# Patient Record
Sex: FEMALE | Race: WHITE | NOT HISPANIC OR LATINO | Employment: UNEMPLOYED | URBAN - METROPOLITAN AREA
[De-identification: names, ages, dates, MRNs, and addresses within clinical notes are randomized per-mention and may not be internally consistent; named-entity substitution may affect disease eponyms.]

---

## 2019-01-06 ENCOUNTER — APPOINTMENT (OUTPATIENT)
Dept: CT IMAGING | Facility: HOSPITAL | Age: 44
End: 2019-01-06

## 2019-01-06 ENCOUNTER — HOSPITAL ENCOUNTER (EMERGENCY)
Facility: HOSPITAL | Age: 44
Discharge: HOME | End: 2019-01-07
Attending: EMERGENCY MEDICINE | Admitting: EMERGENCY MEDICINE

## 2019-01-06 DIAGNOSIS — S06.0X0A CONCUSSION WITHOUT LOSS OF CONSCIOUSNESS, INITIAL ENCOUNTER: Primary | ICD-10-CM

## 2019-01-06 PROCEDURE — 99284 EMERGENCY DEPT VISIT MOD MDM: CPT

## 2019-01-06 PROCEDURE — 70450 CT HEAD/BRAIN W/O DYE: CPT

## 2019-01-06 RX ORDER — ACETAMINOPHEN 500 MG
1000 TABLET ORAL ONCE
Status: DISCONTINUED | OUTPATIENT
Start: 2019-01-06 | End: 2019-01-06

## 2019-01-06 RX ORDER — ONDANSETRON 4 MG/1
4 TABLET, ORALLY DISINTEGRATING ORAL ONCE
Status: COMPLETED | OUTPATIENT
Start: 2019-01-06 | End: 2019-01-06

## 2019-01-06 RX ORDER — ONDANSETRON 4 MG/1
4 TABLET, ORALLY DISINTEGRATING ORAL ONCE
Status: DISCONTINUED | OUTPATIENT
Start: 2019-01-06 | End: 2019-01-06

## 2019-01-06 RX ORDER — TRAZODONE HYDROCHLORIDE 50 MG/1
50 TABLET ORAL NIGHTLY
COMMUNITY

## 2019-01-06 RX ORDER — ACETAMINOPHEN 500 MG
1000 TABLET ORAL ONCE
Status: COMPLETED | OUTPATIENT
Start: 2019-01-06 | End: 2019-01-06

## 2019-01-06 RX ORDER — BUPROPION HYDROCHLORIDE 150 MG/1
300 TABLET, EXTENDED RELEASE ORAL 2 TIMES DAILY
COMMUNITY
End: 2022-02-10

## 2019-01-06 RX ORDER — LEVOTHYROXINE SODIUM 0.05 MG/1
50 TABLET ORAL DAILY
COMMUNITY
End: 2022-02-10

## 2019-01-06 RX ADMIN — ONDANSETRON 4 MG: 4 TABLET, ORALLY DISINTEGRATING ORAL at 22:56

## 2019-01-06 RX ADMIN — ACETAMINOPHEN 1000 MG: 500 TABLET ORAL at 22:54

## 2019-01-07 VITALS
TEMPERATURE: 97.7 F | BODY MASS INDEX: 30.31 KG/M2 | HEIGHT: 68 IN | OXYGEN SATURATION: 95 % | RESPIRATION RATE: 16 BRPM | WEIGHT: 200 LBS | HEART RATE: 88 BPM | SYSTOLIC BLOOD PRESSURE: 132 MMHG | DIASTOLIC BLOOD PRESSURE: 88 MMHG

## 2019-01-07 RX ORDER — ONDANSETRON 4 MG/1
4 TABLET, ORALLY DISINTEGRATING ORAL EVERY 6 HOURS PRN
Qty: 20 TABLET | Refills: 0 | Status: SHIPPED | OUTPATIENT
Start: 2019-01-07 | End: 2019-01-12

## 2019-01-07 NOTE — ED PROVIDER NOTES
Subjective   Ms. Melissa Smith is a 43 y.o. female who presents to the ED for evaluation s/p fall. She reports 2 hours ago she was walking out of the bathroom when she had a mechanical trip and fall and landed on her left side and did hit her head. She is not sure if she lost consciousness. She reports she was able to ambulate and carry out tasks but she began experiencing left sided headache which radiates to the back of her neck. She describes the radiating pain as sharp. She also complains of numbness in her cheeks. She denies history of head surgery. She has Factor V Leiden mutation and does not take anticoagulants. There are no other acute symptoms at this time.        History provided by:  Patient  Fall   Mechanism of injury: fall    Injury location:  Head/neck, shoulder/arm and pelvis  Head/neck injury location:  Head  Shoulder/arm injury location:  L shoulder  Pelvic injury location:  L hip  Incident location:  Home  Time since incident:  2 hours  Arrived directly from scene: no    Fall:     Fall occurred:  Walking and tripped    Impact surface:  Hard floor    Point of impact: Left side including head.  Protective equipment: none    Tetanus status:  Unknown  Prior to arrival data:     Patient ambulatory at scene: yes      Blood loss:  None    Amnesic to event: no    Associated symptoms: headaches    Risk factors: no anticoagulation therapy        Review of Systems   Neurological: Positive for headaches.   All other systems reviewed and are negative.      Past Medical History:   Diagnosis Date   • Charcot-Gladys-Tooth disease    • Disease of thyroid gland        Allergies   Allergen Reactions   • Sulfa Antibiotics Anaphylaxis   • Amoxicillin Rash       Past Surgical History:   Procedure Laterality Date   •  SECTION     • CHOLECYSTECTOMY     • FOOT SURGERY         History reviewed. No pertinent family history.    Social History     Socioeconomic History   • Marital status:      Spouse name:  Not on file   • Number of children: Not on file   • Years of education: Not on file   • Highest education level: Not on file   Tobacco Use   • Smoking status: Never Smoker   Substance and Sexual Activity   • Alcohol use: Yes     Comment: rare   • Drug use: No         Objective   Physical Exam   Constitutional: She is oriented to person, place, and time. She appears well-developed and well-nourished. No distress.   HENT:   Head: Normocephalic and atraumatic.   Nose: Nose normal.   Eyes: Conjunctivae are normal. No scleral icterus.   Neck: Normal range of motion. Neck supple.   Cardiovascular: Normal rate, regular rhythm and normal heart sounds.   No murmur heard.  Pulmonary/Chest: Effort normal and breath sounds normal. No respiratory distress.   Abdominal: Soft. Bowel sounds are normal. There is no tenderness.   Musculoskeletal: Normal range of motion. She exhibits no edema.   Neurological: She is alert and oriented to person, place, and time.   Skin: Skin is warm and dry.   Psychiatric: She has a normal mood and affect. Her behavior is normal.   Nursing note and vitals reviewed.      Procedures         ED Course     No results found for this or any previous visit (from the past 24 hour(s)).  Note: In addition to lab results from this visit, the labs listed above may include labs taken at another facility or during a different encounter within the last 24 hours. Please correlate lab times with ED admission and discharge times for further clarification of the services performed during this visit.    CT Head Without Contrast   Final Result   No acute intracranial hemorrhage or mass effect.       THIS DOCUMENT HAS BEEN ELECTRONICALLY SIGNED BY MICHAEL CASTELLANOS MD        Vitals:    01/06/19 2320 01/06/19 2330 01/07/19 0000 01/07/19 0033   BP:  132/80 132/88 132/88   BP Location:    Right arm   Patient Position:    Lying   Pulse:    88   Resp:    16   Temp:       TempSrc:       SpO2: 95% 95% 94% 95%   Weight:       Height:          Medications   acetaminophen (TYLENOL) tablet 1,000 mg (1,000 mg Oral Given 1/6/19 9393)   ondansetron ODT (ZOFRAN-ODT) disintegrating tablet 4 mg (4 mg Oral Given 1/6/19 2256)     ECG/EMG Results (last 24 hours)     ** No results found for the last 24 hours. **                        MDM  Number of Diagnoses or Management Options  Concussion without loss of consciousness, initial encounter: new and requires workup  Diagnosis management comments: CT scan head does not show any acute after maladies.    Patient has maintained normal and stable vital signs throughout ER course.    Patient will be discharged with the diagnosis of concussion.    She has been given closed head injury precautions.    She will be given Zofran to help with nausea, and is advised to take Tylenol or ibuprofen to help with pain.       Amount and/or Complexity of Data Reviewed  Tests in the radiology section of CPT®: ordered and reviewed  Obtain history from someone other than the patient: yes  Review and summarize past medical records: yes  Independent visualization of images, tracings, or specimens: yes        Final diagnoses:   Concussion without loss of consciousness, initial encounter       Documentation assistance provided by torres Blackburn.  Information recorded by the torres was done at my direction and has been verified and validated by me.     Gregory Blackburn  01/06/19 2233       Gregory Blackburn  01/07/19 0016       Gregory Blackburn  01/07/19 0026       Christi Greco MD  01/07/19 5000

## 2019-01-07 NOTE — DISCHARGE INSTRUCTIONS
Patient is advised to take ibuprofen or tylenol for any headache or pain.     Take zofran as needed for nausea.     Follow-up with PCP for recheck in 1 week.

## 2022-02-10 ENCOUNTER — OFFICE VISIT (OUTPATIENT)
Dept: NEUROLOGY | Facility: CLINIC | Age: 47
End: 2022-02-10

## 2022-02-10 ENCOUNTER — LAB (OUTPATIENT)
Dept: LAB | Facility: HOSPITAL | Age: 47
End: 2022-02-10

## 2022-02-10 VITALS
OXYGEN SATURATION: 98 % | HEART RATE: 86 BPM | BODY MASS INDEX: 34.1 KG/M2 | DIASTOLIC BLOOD PRESSURE: 80 MMHG | WEIGHT: 225 LBS | HEIGHT: 68 IN | SYSTOLIC BLOOD PRESSURE: 122 MMHG

## 2022-02-10 DIAGNOSIS — R20.2 NUMBNESS AND TINGLING: Primary | ICD-10-CM

## 2022-02-10 DIAGNOSIS — M79.10 MYALGIA: ICD-10-CM

## 2022-02-10 DIAGNOSIS — R20.2 NUMBNESS AND TINGLING: ICD-10-CM

## 2022-02-10 DIAGNOSIS — R20.0 NUMBNESS AND TINGLING: ICD-10-CM

## 2022-02-10 DIAGNOSIS — R20.0 NUMBNESS AND TINGLING: Primary | ICD-10-CM

## 2022-02-10 PROCEDURE — 99204 OFFICE O/P NEW MOD 45 MIN: CPT | Performed by: PSYCHIATRY & NEUROLOGY

## 2022-02-10 RX ORDER — NAPROXEN 500 MG/1
500 TABLET ORAL 2 TIMES DAILY
COMMUNITY
Start: 2021-12-12

## 2022-02-10 RX ORDER — CYCLOBENZAPRINE HCL 10 MG
TABLET ORAL EVERY 12 HOURS SCHEDULED
COMMUNITY

## 2022-02-10 RX ORDER — DIPHENOXYLATE HYDROCHLORIDE AND ATROPINE SULFATE 2.5; .025 MG/1; MG/1
TABLET ORAL
COMMUNITY

## 2022-02-10 RX ORDER — METHOCARBAMOL 750 MG/1
TABLET, FILM COATED ORAL
COMMUNITY
Start: 2021-11-11

## 2022-02-10 RX ORDER — MULTIVITAMIN WITH IRON
TABLET ORAL
COMMUNITY

## 2022-02-10 RX ORDER — LEVOTHYROXINE SODIUM 0.07 MG/1
75 TABLET ORAL DAILY
COMMUNITY
Start: 2021-11-23

## 2022-02-10 RX ORDER — FEXOFENADINE HCL 180 MG/1
TABLET ORAL
COMMUNITY

## 2022-02-10 NOTE — PROGRESS NOTES
Subjective:    CC: Melissa Smith is seen today in consultation at the request of MARYAN Del Rio for Muscle weakness       HPI:  56-year-old female with a past medical history of hypothyroidism, fibromyalgia presents with muscle weakness and paresthesias.  As per patient she started having numbness and tingling in various parts of her body such as her upper back, arms and feet about 3 years ago.  These episodes can sometimes last for 20 minutes.  She also has muscle pain as well as a feeling of weakness in her arms and legs.  Previous CK was normal.  Her chiropractor did strength testing and she was told that her strength has deteriorated since August in both upper and lower extremities.  In the past she saw a rheumatologist who diagnosed her with fibromyalgia and started her on gabapentin that caused vision problems as well as Cymbalta but also caused her to have side effects.  Lyrica was prescribed but never started due to insurance denying it.  She has a family history of neuropathy in several family members.  Her daughter is also currently being worked up for Lexx-Danlos syndrome which the patient feels she could have as well as she has hypermobility of joints.  In 2019 patient had a MRI brain (after she sustained a concussion causing blurry vision) that was completely normal.  Of note-I reviewed her PCPs notes from [facility]    The following portions of the patient's history were reviewed today and updated as of 02/10/2022  : allergies, current medications, past family history, past medical history, past social history, past surgical history and problem list  These document will be scanned to patient's chart.      Current Outpatient Medications:   •  Calcium Carb-Cholecalciferol 600-800 MG-UNIT chewable tablet, Every 12 (Twelve) Hours., Disp: , Rfl:   •  Cyanocobalamin 5000 MCG sublingual tablet, cyanocobalamin (vit B-12) 5,000 mcg sublingual tablet  Place 1 tablet every day by  "sublingual route., Disp: , Rfl:   •  cyclobenzaprine (FLEXERIL) 10 MG tablet, Every 12 (Twelve) Hours., Disp: , Rfl:   •  fexofenadine (Allegra Allergy) 180 MG tablet, Allegra Allergy  daily, Disp: , Rfl:   •  levothyroxine (SYNTHROID, LEVOTHROID) 75 MCG tablet, Take 75 mcg by mouth Daily., Disp: , Rfl:   •  Magnesium 250 MG tablet, , Disp: , Rfl:   •  methocarbamol (ROBAXIN) 750 MG tablet, , Disp: , Rfl:   •  multivitamin (THERAGRAN) tablet tablet, Daily Multi-Vitamin tablet  Take by oral route., Disp: , Rfl:   •  naproxen (NAPROSYN) 500 MG tablet, Take 500 mg by mouth 2 (Two) Times a Day., Disp: , Rfl:   •  traZODone (DESYREL) 50 MG tablet, Take 50 mg by mouth Every Night., Disp: , Rfl:    Past Medical History:   Diagnosis Date   • Charcot-Gladys-Tooth disease    • Disease of thyroid gland       Past Surgical History:   Procedure Laterality Date   •  SECTION     • CHOLECYSTECTOMY     • FOOT SURGERY        History reviewed. No pertinent family history.   Social History     Socioeconomic History   • Marital status:    Tobacco Use   • Smoking status: Never Smoker   • Smokeless tobacco: Never Used   Vaping Use   • Vaping Use: Never used   Substance and Sexual Activity   • Alcohol use: Yes     Comment: rare   • Drug use: No   • Sexual activity: Defer     Review of Systems    Objective:    /80   Pulse 86   Ht 172.7 cm (68\")   Wt 102 kg (225 lb)   SpO2 98%   BMI 34.21 kg/m²     Neurology Exam:    General apperance: NAD.     Mental status: Alert, awake and oriented to time place and person.    Recent and Remote memory: Intact.    Attention span and Concentration: Normal.     Language and Speech: Intact- No dysarthria.    Fluency, Naming , Repitition and Comprehension:  Intact    Cranial Nerves:   CN II: Visual fields are full. Intact. Fundi - Normal, No papillederma, Pupils - ISIDRO  CN III, IV and VI: Extraocular movements are intact. Normal saccades.   CN V: Facial sensation is intact.   CN VII: " Muscles of facial expression reveal no asymmetry. Intact.   CN VIII: Hearing is intact. Whispered voice intact.   CN IX and X: Palate elevates symmetrically. Intact  CN XI: Shoulder shrug is intact.   CN XII: Tongue is midline without evidence of atrophy or fasciculation.     Ophthalmoscopic exam of optic disc-normal    Motor:  Right UE muscle strength 5/5. Normal tone.     Left UE muscle strength 5/5. Normal tone.      Right LE muscle strength 5-/5. Normal tone.     Left LE muscle strength 5-/5. Normal tone.      Sensory: Normal light touch and pinprick sensation bilaterally as well as her back.  Mildly reduced vibration in feet    DTRs: 3+ bilaterally in upper and lower extremities.  No ankle clonus noted    Babinski: Negative bilaterally.    Co-ordination: Normal finger-to-nose, heel to shin B/L.    Rhomberg: Negative.    Gait: Normal.  Had difficulty with tandem walking    Cardiovascular: Regular rate and rhythm without murmur, gallop or rub.    Assessment and Plan:  1. Numbness and tingling  She could have mild neuropathy in addition to cervical radiculopathy  Since she complains of paresthesias in her upper back radiating into her arms I will get her MRI cervical spine to rule out any demyelinating lesions  Will obtain EMG/NCS and neuropathy panel    - BREANNA by IFA, Reflex 9-biomarkers profile; Future  - Hemoglobin A1c; Future  - Protein Elec + Interp, Serum; Future  - Lyme Disease IgG/IgM Antibodies; Future  - TSH+Free T4; Future  - Vitamin B12 & Folate; Future  - Vitamin B6; Future  - MRI Cervical Spine Without Contrast; Future  - EMG & Nerve Conduction Test; Future    2. Myalgia  I will recheck CK in addition to ESR, CRP and obtain an EMG/NCS  I feel her myalgias/subjective weakness could be due to fibromyalgia  If all her testing is normal then I may try to get Lyrica approved.  In the past she has tried gabapentin and Cymbalta both of which cause side effects  She also wants a referral to a specialist to  rule out Lexx-Danlos however we will wait till testing completed    - Sedimentation Rate; Future  - C-reactive Protein; Future  - CK; Future  - EMG & Nerve Conduction Test; Future       Return in about 6 weeks (around 3/24/2022).     I spent over 45 minutes with the patient face to face out of which over 50% (30 minutes) was spent in management, instructions and education.     Flor Mccollum MD

## 2022-03-02 ENCOUNTER — HOSPITAL ENCOUNTER (OUTPATIENT)
Dept: MRI IMAGING | Facility: HOSPITAL | Age: 47
Discharge: HOME | End: 2022-03-02
Admitting: PSYCHIATRY & NEUROLOGY

## 2022-03-02 DIAGNOSIS — R20.0 NUMBNESS AND TINGLING: ICD-10-CM

## 2022-03-02 DIAGNOSIS — R20.2 NUMBNESS AND TINGLING: ICD-10-CM

## 2022-03-02 LAB
ALBUMIN SERPL ELPH-MCNC: 4 G/DL (ref 2.9–4.4)
ALBUMIN/GLOB SERPL: 1.3 {RATIO} (ref 0.7–1.7)
ALPHA1 GLOB SERPL ELPH-MCNC: 0.3 G/DL (ref 0–0.4)
ALPHA2 GLOB SERPL ELPH-MCNC: 0.7 G/DL (ref 0.4–1)
ANA TITR SER IF: NEGATIVE {TITER}
B-GLOBULIN SERPL ELPH-MCNC: 1.2 G/DL (ref 0.7–1.3)
CK SERPL-CCNC: 50 U/L (ref 20–180)
CRP SERPL-MCNC: 0.34 MG/DL (ref 0–0.5)
ERYTHROCYTE [SEDIMENTATION RATE] IN BLOOD BY WESTERGREN METHOD: 16 MM/HR (ref 0–20)
FOLATE SERPL-MCNC: 14.8 NG/ML (ref 4.78–24.2)
GAMMA GLOB SERPL ELPH-MCNC: 1.1 G/DL (ref 0.4–1.8)
GLOBULIN SER-MCNC: 3.3 G/DL (ref 2.2–3.9)
HBA1C MFR BLD: 5.3 % (ref 4.8–5.6)
IGA SERPL-MCNC: 171 MG/DL (ref 87–352)
IGG SERPL-MCNC: 985 MG/DL (ref 586–1602)
IGM SERPL-MCNC: 152 MG/DL (ref 26–217)
INTERPRETATION SERPL IEP-IMP: NORMAL
LABORATORY COMMENT REPORT: NORMAL
LABORATORY COMMENT REPORT: NORMAL
M PROTEIN SERPL ELPH-MCNC: NORMAL G/DL
PROT SERPL-MCNC: 7.3 G/DL (ref 6–8.5)
T4 FREE SERPL-MCNC: 1.47 NG/DL (ref 0.93–1.7)
TSH SERPL DL<=0.005 MIU/L-ACNC: 1.49 UIU/ML (ref 0.27–4.2)
VIT B12 SERPL-MCNC: 963 PG/ML (ref 211–946)
VIT B6 SERPL-MCNC: 6.1 UG/L (ref 2–32.8)

## 2022-03-02 PROCEDURE — 72141 MRI NECK SPINE W/O DYE: CPT

## 2022-03-16 ENCOUNTER — HOSPITAL ENCOUNTER (OUTPATIENT)
Dept: NEUROLOGY | Facility: HOSPITAL | Age: 47
Discharge: HOME | End: 2022-03-16
Admitting: PSYCHIATRY & NEUROLOGY

## 2022-03-16 DIAGNOSIS — R20.0 NUMBNESS AND TINGLING: ICD-10-CM

## 2022-03-16 DIAGNOSIS — R20.2 NUMBNESS AND TINGLING: ICD-10-CM

## 2022-03-16 DIAGNOSIS — M79.10 MYALGIA: ICD-10-CM

## 2022-03-16 PROCEDURE — 95886 MUSC TEST DONE W/N TEST COMP: CPT

## 2022-03-16 PROCEDURE — 95911 NRV CNDJ TEST 9-10 STUDIES: CPT

## 2022-04-06 ENCOUNTER — OFFICE VISIT (OUTPATIENT)
Dept: NEUROLOGY | Facility: CLINIC | Age: 47
End: 2022-04-06

## 2022-04-06 VITALS
DIASTOLIC BLOOD PRESSURE: 80 MMHG | HEART RATE: 93 BPM | HEIGHT: 68 IN | OXYGEN SATURATION: 95 % | SYSTOLIC BLOOD PRESSURE: 128 MMHG | TEMPERATURE: 97.5 F | BODY MASS INDEX: 34.22 KG/M2 | WEIGHT: 225.8 LBS

## 2022-04-06 DIAGNOSIS — M79.7 FIBROMYALGIA: ICD-10-CM

## 2022-04-06 DIAGNOSIS — R20.0 NUMBNESS AND TINGLING: Primary | ICD-10-CM

## 2022-04-06 DIAGNOSIS — R20.2 NUMBNESS AND TINGLING: Primary | ICD-10-CM

## 2022-04-06 DIAGNOSIS — G44.85 IDIOPATHIC STABBING HEADACHE: ICD-10-CM

## 2022-04-06 PROCEDURE — 99214 OFFICE O/P EST MOD 30 MIN: CPT | Performed by: PSYCHIATRY & NEUROLOGY

## 2022-04-06 RX ORDER — INDOMETHACIN 50 MG/1
CAPSULE ORAL
Qty: 30 CAPSULE | Refills: 1 | Status: SHIPPED | OUTPATIENT
Start: 2022-04-06

## 2022-04-06 NOTE — PROGRESS NOTES
Subjective:    CC: Melissa Smith is seen today for paresthesias and muscle weakness    Current visit-since her last visit patient had her MRI of the cervical spine that did not show any lesions or significant disc bulges with no spinal canal or neural foraminal narrowing.  She also had her EMG/NCS of her right arm and left leg that did not show any evidence of peripheral neuropathy, myopathy or compression neuropathy.  Her neuropathy panel was completely normal other than a slightly low vitamin B6 level of 6.1.  She states she continues to have tingling in different parts of her body such as her face, upper back, hands and legs (more so on the right) as well as a feeling of weakness and pain in her muscles/joints.  She takes either Flexeril or methocarbamol at night as needed.  She has also been having stabbing headaches lasting for 5 to 10 minutes.  Denies snoring at night or any daytime somnolence.  Of note-I personally reviewed her MRI cervical spine    Initial oipsr-44-dagh-old female with a past medical history of hypothyroidism, fibromyalgia presents with muscle weakness and paresthesias.  As per patient she started having numbness and tingling in various parts of her body such as her upper back, arms and feet about 3 years ago.  These episodes can sometimes last for 20 minutes.  She also has muscle pain as well as a feeling of weakness in her arms and legs.  Previous CK was normal.  Her chiropractor did strength testing and she was told that her strength has deteriorated since August in both upper and lower extremities.  In the past she saw a rheumatologist who diagnosed her with fibromyalgia and started her on gabapentin that caused vision problems as well as Cymbalta but also caused her to have side effects.  Lyrica was prescribed but never started due to insurance denying it.  She has a family history of neuropathy in several family members.  Her daughter is also currently being worked up for  Lexx-Danlos syndrome which the patient feels she could have as well as she has hypermobility of joints.  In 2019 patient had a MRI brain (after she sustained a concussion causing blurry vision) that was completely normal.  Of note-I reviewed her PCPs notes from [facility]    The following portions of the patient's history were reviewed today and updated as of 02/10/2022  : allergies, current medications, past family history, past medical history, past social history, past surgical history and problem list  These document will be scanned to patient's chart.      Current Outpatient Medications:   •  Calcium Carb-Cholecalciferol 600-800 MG-UNIT chewable tablet, Every 12 (Twelve) Hours., Disp: , Rfl:   •  Cyanocobalamin 5000 MCG sublingual tablet, cyanocobalamin (vit B-12) 5,000 mcg sublingual tablet  Place 1 tablet every day by sublingual route., Disp: , Rfl:   •  cyclobenzaprine (FLEXERIL) 10 MG tablet, Every 12 (Twelve) Hours., Disp: , Rfl:   •  fexofenadine (ALLEGRA) 180 MG tablet, Allegra Allergy  daily, Disp: , Rfl:   •  levothyroxine (SYNTHROID, LEVOTHROID) 75 MCG tablet, Take 75 mcg by mouth Daily., Disp: , Rfl:   •  Magnesium 250 MG tablet, , Disp: , Rfl:   •  methocarbamol (ROBAXIN) 750 MG tablet, , Disp: , Rfl:   •  multivitamin (THERAGRAN) tablet tablet, Daily Multi-Vitamin tablet  Take by oral route., Disp: , Rfl:   •  naproxen (NAPROSYN) 500 MG tablet, Take 500 mg by mouth 2 (Two) Times a Day., Disp: , Rfl:   •  traZODone (DESYREL) 50 MG tablet, Take 50 mg by mouth Every Night., Disp: , Rfl:   •  indomethacin (INDOCIN) 50 MG capsule, Take 1 tablet as needed for stabbing headaches.  May take up to 3 pills a day, Disp: 30 capsule, Rfl: 1   Past Medical History:   Diagnosis Date   • Charcot-Gladys-Tooth disease    • Disease of thyroid gland       Past Surgical History:   Procedure Laterality Date   •  SECTION     • CHOLECYSTECTOMY     • FOOT SURGERY        History reviewed. No pertinent family  "history.   Social History     Socioeconomic History   • Marital status:    Tobacco Use   • Smoking status: Never Smoker   • Smokeless tobacco: Never Used   Vaping Use   • Vaping Use: Never used   Substance and Sexual Activity   • Alcohol use: Yes     Comment: rare   • Drug use: No   • Sexual activity: Defer     Review of Systems   Musculoskeletal: Positive for myalgias.   Neurological: Positive for numbness and headache.   All other systems reviewed and are negative.      Objective:    /80   Pulse 93   Temp 97.5 °F (36.4 °C)   Ht 172.7 cm (68\")   Wt 102 kg (225 lb 12.8 oz)   SpO2 95%   BMI 34.33 kg/m²     Neurology Exam:    General apperance: NAD.     Mental status: Alert, awake and oriented to time place and person.    Recent and Remote memory: Intact.    Attention span and Concentration: Normal.     Language and Speech: Intact- No dysarthria.    Fluency, Naming , Repitition and Comprehension:  Intact    Cranial Nerves:   CN II: Visual fields are full. Intact. Fundi - Normal, No papillederma, Pupils - ISIDRO  CN III, IV and VI: Extraocular movements are intact. Normal saccades.   CN V: Facial sensation is intact.   CN VII: Muscles of facial expression reveal no asymmetry. Intact.   CN VIII: Hearing is intact. Whispered voice intact.   CN IX and X: Palate elevates symmetrically. Intact  CN XI: Shoulder shrug is intact.   CN XII: Tongue is midline without evidence of atrophy or fasciculation.     Ophthalmoscopic exam of optic disc-normal    Motor:  Right UE muscle strength 5/5. Normal tone.     Left UE muscle strength 5/5. Normal tone.      Right LE muscle strength 5-/5. Normal tone.     Left LE muscle strength 5-/5. Normal tone.      Sensory: Normal light touch and pinprick sensation bilaterally as well as her back.  Mildly reduced vibration in feet    DTRs: 3+ bilaterally in upper and lower extremities.  No ankle clonus noted    Babinski: Negative bilaterally.    Co-ordination: Normal " finger-to-nose, heel to pickens B/L.    Rhomberg: Negative.    Gait: Normal.  Had difficulty with tandem walking    Cardiovascular: Regular rate and rhythm without murmur, gallop or rub.    Assessment and Plan:  1. Numbness and tingling  Patient has paresthesias in different parts of her body including her face, back, arms and legs.  EMG/NCS was normal.  MRI of the cervical spine was also unremarkable  She could start low doses of Lyrica in the future  I have told her to take vitamin B6 supplements for a few months as her level was low  For possible Lexx-Danlos syndrome she could speak to her daughter's physician at [facility] to see if she could be referred for genetic testing as well    2.  Fibromyalgia  Her EMG/NCS was unremarkable.  CK was also normal  I discussed starting her on low doses of Lyrica at night however she wants to wait.  She has previously tried gabapentin and Cymbalta which caused her to have side effects.    3.  Stabbing headaches  I will prescribe her indomethacin 50 mg as needed    Return in about 6 months (around 10/6/2022).     I spent over 25 minutes with the patient face to face out of which over 50% (20 minutes) was spent in management, instructions and education.     Flor Mccollum MD

## 2024-08-04 ENCOUNTER — HOSPITAL ENCOUNTER (EMERGENCY)
Facility: HOSPITAL | Age: 49
Discharge: HOME | End: 2024-08-04
Attending: EMERGENCY MEDICINE | Admitting: EMERGENCY MEDICINE
Payer: MEDICAID

## 2024-08-04 VITALS
TEMPERATURE: 98.1 F | BODY MASS INDEX: 35.46 KG/M2 | DIASTOLIC BLOOD PRESSURE: 76 MMHG | OXYGEN SATURATION: 96 % | HEART RATE: 75 BPM | RESPIRATION RATE: 16 BRPM | WEIGHT: 234 LBS | HEIGHT: 68 IN | SYSTOLIC BLOOD PRESSURE: 128 MMHG

## 2024-08-04 DIAGNOSIS — N30.01 ACUTE CYSTITIS WITH HEMATURIA: ICD-10-CM

## 2024-08-04 DIAGNOSIS — R19.7 DIARRHEA, UNSPECIFIED TYPE: Primary | ICD-10-CM

## 2024-08-04 LAB
ALBUMIN SERPL-MCNC: 4.1 G/DL (ref 3.5–5.2)
ALBUMIN/GLOB SERPL: 1.4 G/DL
ALP SERPL-CCNC: 87 U/L (ref 39–117)
ALT SERPL W P-5'-P-CCNC: 18 U/L (ref 1–33)
ANION GAP SERPL CALCULATED.3IONS-SCNC: 13.2 MMOL/L (ref 5–15)
AST SERPL-CCNC: 22 U/L (ref 1–32)
BACTERIA UR QL AUTO: ABNORMAL /HPF
BASOPHILS # BLD AUTO: 0.01 10*3/MM3 (ref 0–0.2)
BASOPHILS NFR BLD AUTO: 0.1 % (ref 0–1.5)
BILIRUB SERPL-MCNC: 0.3 MG/DL (ref 0–1.2)
BILIRUB UR QL STRIP: NEGATIVE
BUN SERPL-MCNC: 11 MG/DL (ref 6–20)
BUN/CREAT SERPL: 16.7 (ref 7–25)
CALCIUM SPEC-SCNC: 9 MG/DL (ref 8.6–10.5)
CHLORIDE SERPL-SCNC: 102 MMOL/L (ref 98–107)
CLARITY UR: CLEAR
CO2 SERPL-SCNC: 20.8 MMOL/L (ref 22–29)
COLOR UR: YELLOW
CREAT SERPL-MCNC: 0.66 MG/DL (ref 0.57–1)
DEPRECATED RDW RBC AUTO: 43 FL (ref 37–54)
EGFRCR SERPLBLD CKD-EPI 2021: 107.7 ML/MIN/1.73
EOSINOPHIL # BLD AUTO: 0.01 10*3/MM3 (ref 0–0.4)
EOSINOPHIL NFR BLD AUTO: 0.1 % (ref 0.3–6.2)
ERYTHROCYTE [DISTWIDTH] IN BLOOD BY AUTOMATED COUNT: 13.1 % (ref 12.3–15.4)
GLOBULIN UR ELPH-MCNC: 2.9 GM/DL
GLUCOSE SERPL-MCNC: 113 MG/DL (ref 65–99)
GLUCOSE UR STRIP-MCNC: NEGATIVE MG/DL
HCT VFR BLD AUTO: 41.4 % (ref 34–46.6)
HGB BLD-MCNC: 13.7 G/DL (ref 12–15.9)
HGB UR QL STRIP.AUTO: ABNORMAL
HOLD SPECIMEN: NORMAL
HYALINE CASTS UR QL AUTO: ABNORMAL /LPF
IMM GRANULOCYTES # BLD AUTO: 0.01 10*3/MM3 (ref 0–0.05)
IMM GRANULOCYTES NFR BLD AUTO: 0.1 % (ref 0–0.5)
KETONES UR QL STRIP: ABNORMAL
LEUKOCYTE ESTERASE UR QL STRIP.AUTO: NEGATIVE
LIPASE SERPL-CCNC: 17 U/L (ref 13–60)
LYMPHOCYTES # BLD AUTO: 1.72 10*3/MM3 (ref 0.7–3.1)
LYMPHOCYTES NFR BLD AUTO: 17 % (ref 19.6–45.3)
MAGNESIUM SERPL-MCNC: 1.9 MG/DL (ref 1.6–2.6)
MCH RBC QN AUTO: 29.2 PG (ref 26.6–33)
MCHC RBC AUTO-ENTMCNC: 33.1 G/DL (ref 31.5–35.7)
MCV RBC AUTO: 88.3 FL (ref 79–97)
MONOCYTES # BLD AUTO: 0.52 10*3/MM3 (ref 0.1–0.9)
MONOCYTES NFR BLD AUTO: 5.2 % (ref 5–12)
MUCOUS THREADS URNS QL MICRO: ABNORMAL /HPF
NEUTROPHILS NFR BLD AUTO: 7.82 10*3/MM3 (ref 1.7–7)
NEUTROPHILS NFR BLD AUTO: 77.5 % (ref 42.7–76)
NITRITE UR QL STRIP: NEGATIVE
PH UR STRIP.AUTO: 6 [PH] (ref 5–8)
PLATELET # BLD AUTO: 345 10*3/MM3 (ref 140–450)
PMV BLD AUTO: 8.9 FL (ref 6–12)
POTASSIUM SERPL-SCNC: 3.9 MMOL/L (ref 3.5–5.2)
PROT SERPL-MCNC: 7 G/DL (ref 6–8.5)
PROT UR QL STRIP: NEGATIVE
RBC # BLD AUTO: 4.69 10*6/MM3 (ref 3.77–5.28)
RBC # UR STRIP: ABNORMAL /HPF
REF LAB TEST METHOD: ABNORMAL
SODIUM SERPL-SCNC: 136 MMOL/L (ref 136–145)
SP GR UR STRIP: 1.02 (ref 1–1.03)
SQUAMOUS #/AREA URNS HPF: ABNORMAL /HPF
UROBILINOGEN UR QL STRIP: ABNORMAL
WBC # UR STRIP: ABNORMAL /HPF
WBC NRBC COR # BLD AUTO: 10.09 10*3/MM3 (ref 3.4–10.8)
WHOLE BLOOD HOLD COAG: NORMAL
WHOLE BLOOD HOLD SPECIMEN: NORMAL

## 2024-08-04 PROCEDURE — 99283 EMERGENCY DEPT VISIT LOW MDM: CPT

## 2024-08-04 PROCEDURE — 25010000002 ONDANSETRON PER 1 MG

## 2024-08-04 PROCEDURE — 25010000002 KETOROLAC TROMETHAMINE PER 15 MG

## 2024-08-04 PROCEDURE — 96375 TX/PRO/DX INJ NEW DRUG ADDON: CPT

## 2024-08-04 PROCEDURE — 85025 COMPLETE CBC W/AUTO DIFF WBC: CPT

## 2024-08-04 PROCEDURE — 25810000003 SODIUM CHLORIDE 0.9 % SOLUTION

## 2024-08-04 PROCEDURE — 81001 URINALYSIS AUTO W/SCOPE: CPT

## 2024-08-04 PROCEDURE — 96361 HYDRATE IV INFUSION ADD-ON: CPT

## 2024-08-04 PROCEDURE — 83690 ASSAY OF LIPASE: CPT

## 2024-08-04 PROCEDURE — 83735 ASSAY OF MAGNESIUM: CPT

## 2024-08-04 PROCEDURE — 80053 COMPREHEN METABOLIC PANEL: CPT

## 2024-08-04 PROCEDURE — 96374 THER/PROPH/DIAG INJ IV PUSH: CPT

## 2024-08-04 RX ORDER — DICYCLOMINE HCL 20 MG
20 TABLET ORAL EVERY 8 HOURS PRN
Qty: 18 TABLET | Refills: 0 | Status: SHIPPED | OUTPATIENT
Start: 2024-08-04 | End: 2024-08-22

## 2024-08-04 RX ORDER — ONDANSETRON 2 MG/ML
4 INJECTION INTRAMUSCULAR; INTRAVENOUS ONCE
Status: COMPLETED | OUTPATIENT
Start: 2024-08-04 | End: 2024-08-04

## 2024-08-04 RX ORDER — DICYCLOMINE HYDROCHLORIDE 10 MG/1
20 CAPSULE ORAL ONCE
Status: COMPLETED | OUTPATIENT
Start: 2024-08-04 | End: 2024-08-04

## 2024-08-04 RX ORDER — NITROFURANTOIN 25; 75 MG/1; MG/1
100 CAPSULE ORAL 2 TIMES DAILY
Qty: 5 CAPSULE | Refills: 0 | Status: SHIPPED | OUTPATIENT
Start: 2024-08-04 | End: 2024-08-07

## 2024-08-04 RX ORDER — KETOROLAC TROMETHAMINE 15 MG/ML
15 INJECTION, SOLUTION INTRAMUSCULAR; INTRAVENOUS ONCE
Status: COMPLETED | OUTPATIENT
Start: 2024-08-04 | End: 2024-08-04

## 2024-08-04 RX ORDER — LOPERAMIDE HYDROCHLORIDE 2 MG/1
2 CAPSULE ORAL 4 TIMES DAILY PRN
Qty: 20 CAPSULE | Refills: 0 | Status: SHIPPED | OUTPATIENT
Start: 2024-08-04 | End: 2024-08-24

## 2024-08-04 RX ORDER — SODIUM CHLORIDE 0.9 % (FLUSH) 0.9 %
10 SYRINGE (ML) INJECTION AS NEEDED
Status: DISCONTINUED | OUTPATIENT
Start: 2024-08-04 | End: 2024-08-04 | Stop reason: HOSPADM

## 2024-08-04 RX ORDER — NITROFURANTOIN 25; 75 MG/1; MG/1
100 CAPSULE ORAL ONCE
Status: COMPLETED | OUTPATIENT
Start: 2024-08-04 | End: 2024-08-04

## 2024-08-04 RX ADMIN — KETOROLAC TROMETHAMINE 15 MG: 15 INJECTION, SOLUTION INTRAMUSCULAR; INTRAVENOUS at 03:44

## 2024-08-04 RX ADMIN — DICYCLOMINE HYDROCHLORIDE 20 MG: 10 CAPSULE ORAL at 03:44

## 2024-08-04 RX ADMIN — SODIUM CHLORIDE 1000 ML: 9 INJECTION, SOLUTION INTRAVENOUS at 03:44

## 2024-08-04 RX ADMIN — ONDANSETRON 4 MG: 2 INJECTION INTRAMUSCULAR; INTRAVENOUS at 03:44

## 2024-08-04 RX ADMIN — NITROFURANTOIN MONOHYDRATE/MACROCRYSTALS 100 MG: 75; 25 CAPSULE ORAL at 05:35

## 2024-08-04 NOTE — [facility] PROVIDER NOTE
Subjective  History of Present Illness:    Patient is a 48-year-old female who presents the emergency department today with complaints of multiple episodes of diarrhea.  Patient states she has had approximately 10 episodes of diarrhea between yesterday and today.  Patient states she had this for the past 2 days.  Patient admits to nausea but denies any episodes of vomiting.  Patient denies any fevers or chills.  Patient admits to generalized abdominal cramps and mild pain in her right flank.  Patient rates the cramps a 6 out of 10 on the pain scale.  Patient has not taken any pain medications prior to arrival.  Patient denies any recent use of antibiotics.  Patient denies any blood in the diarrhea.  Patient denies any suspicious food ingestion.  Patient denies any recent ill contacts.  Patient states she has tried to drink a significant amount of Gatorade but it runs right through her.  Patient is concerned she may have electrolyte abnormalities at this time due to the amount of diarrhea she has had.      Nurses Notes reviewed and agree, including vitals, allergies, social history and prior medical history.     REVIEW OF SYSTEMS: All systems reviewed and not pertinent unless noted.  Review of Systems   Constitutional:  Negative for activity change, appetite change, chills, fatigue and fever.   Respiratory:  Negative for chest tightness, shortness of breath and wheezing.    Cardiovascular:  Negative for chest pain and palpitations.   Gastrointestinal:  Positive for abdominal pain, diarrhea and nausea. Negative for abdominal distention, blood in stool, constipation and vomiting.   Genitourinary:  Negative for dysuria, frequency, pelvic pain, urgency, vaginal bleeding and vaginal pain.   Musculoskeletal:  Positive for back pain.   Neurological:  Negative for dizziness, seizures, numbness and headaches.   All other systems reviewed and are negative.      Past Medical History:   Diagnosis Date    Charcot-Gladys-Tooth  "disease     Disease of thyroid gland        Allergies:    Black walnut pollen allergy skin test, Sulfa antibiotics, Capsaicin, Oxycodone-acetaminophen, Tomato, and Amoxicillin      Past Surgical History:   Procedure Laterality Date     SECTION      CHOLECYSTECTOMY      FOOT SURGERY           Social History     Socioeconomic History    Marital status:    Tobacco Use    Smoking status: Never    Smokeless tobacco: Never   Vaping Use    Vaping status: Never Used   Substance and Sexual Activity    Alcohol use: Yes     Comment: rare    Drug use: No    Sexual activity: Defer         History reviewed. No pertinent family history.    Objective  Physical Exam:  /76   Pulse 75   Temp 98.1 °F (36.7 °C) (Oral)   Resp 16   Ht 172.7 cm (68\")   Wt 106 kg (234 lb)   LMP 2022   SpO2 96%   BMI 35.58 kg/m²      Physical Exam  Vitals and nursing note reviewed.   Constitutional:       General: She is not in acute distress.     Appearance: She is well-developed. She is obese. She is not ill-appearing, toxic-appearing or diaphoretic.   HENT:      Head: Normocephalic and atraumatic.      Mouth/Throat:      Mouth: Mucous membranes are moist.      Pharynx: Oropharynx is clear.   Eyes:      Extraocular Movements: Extraocular movements intact.      Pupils: Pupils are equal, round, and reactive to light.   Cardiovascular:      Rate and Rhythm: Normal rate and regular rhythm.      Heart sounds: Normal heart sounds. No murmur heard.  Pulmonary:      Effort: Pulmonary effort is normal.      Breath sounds: Normal breath sounds. No stridor. No wheezing, rhonchi or rales.   Chest:      Chest wall: No tenderness.   Abdominal:      General: Bowel sounds are increased. There is no distension.      Palpations: Abdomen is soft. There is no hepatomegaly, splenomegaly or mass.      Tenderness: There is generalized abdominal tenderness (Mild tenderness with palpation generalized.). There is no right CVA tenderness or left " CVA tenderness. Negative signs include Smith's sign, Rovsing's sign and McBurney's sign.      Hernia: No hernia is present.   Skin:     General: Skin is warm.      Capillary Refill: Capillary refill takes less than 2 seconds.   Neurological:      General: No focal deficit present.      Mental Status: She is alert and oriented to person, place, and time.   Psychiatric:         Mood and Affect: Mood normal. Mood is not anxious or depressed.         Behavior: Behavior normal.         Procedures    ED Course:    ED Course as of 08/04/24 0630   Sun Aug 04, 2024   0408 Magnesium: 1.9 [MV]   0408 Lipase: 17 [MV]   0408 Glucose(!): 113 [MV]   0408 Creatinine: 0.66 [MV]   0408 Sodium: 136 [MV]   0408 Potassium: 3.9 [MV]   0408 CO2(!): 20.8 [MV]   0408 WBC: 10.09 [MV]   0519 RBC, UA(!): 11-20 [MV]   0519 WBC, UA(!): 3-5 [MV]   0519 Bacteria, UA(!): 2+ [MV]   0519 Squamous Epithelial Cells, UA(!): 3-6 [MV]   0519 Mucus, UA(!): Large/3+ [MV]   0519 Blood, UA(!): Trace [MV]   0519 Ketones, UA(!): 15 mg/dL (1+) [MV]      ED Course User Index  [MV] Vernell Ma PA-C       Lab Results (last 24 hours)       Procedure Component Value Units Date/Time    CBC & Differential [[id number]]  (Abnormal) Collected: 08/04/24 0325    Specimen: Blood Updated: 08/04/24 0334    Narrative:      The following orders were created for panel order CBC & Differential.  Procedure                               Abnormality         Status                     ---------                               -----------         ------                     CBC Auto Differential[[id number]]        Abnormal            Final result                 Please view results for these tests on the individual orders.    CBC Auto Differential [[id number]]  (Abnormal) Collected: 08/04/24 0325    Specimen: Blood Updated: 08/04/24 0334     WBC 10.09 10*3/mm3      RBC 4.69 10*6/mm3      Hemoglobin 13.7 g/dL      Hematocrit 41.4 %      MCV 88.3 fL      MCH 29.2 pg      MCHC 33.1  g/dL      RDW 13.1 %      RDW-SD 43.0 fl      MPV 8.9 fL      Platelets 345 10*3/mm3      Neutrophil % 77.5 %      Lymphocyte % 17.0 %      Monocyte % 5.2 %      Eosinophil % 0.1 %      Basophil % 0.1 %      Immature Grans % 0.1 %      Neutrophils, Absolute 7.82 10*3/mm3      Lymphocytes, Absolute 1.72 10*3/mm3      Monocytes, Absolute 0.52 10*3/mm3      Eosinophils, Absolute 0.01 10*3/mm3      Basophils, Absolute 0.01 10*3/mm3      Immature Grans, Absolute 0.01 10*3/mm3     Comprehensive Metabolic Panel [[id number]]  (Abnormal) Collected: 08/04/24 0346    Specimen: Blood Updated: 08/04/24 0407     Glucose 113 mg/dL      BUN 11 mg/dL      Creatinine 0.66 mg/dL      Sodium 136 mmol/L      Potassium 3.9 mmol/L      Chloride 102 mmol/L      CO2 20.8 mmol/L      Calcium 9.0 mg/dL      Total Protein 7.0 g/dL      Albumin 4.1 g/dL      ALT (SGPT) 18 U/L      AST (SGOT) 22 U/L      Alkaline Phosphatase 87 U/L      Total Bilirubin 0.3 mg/dL      Globulin 2.9 gm/dL      A/G Ratio 1.4 g/dL      BUN/Creatinine Ratio 16.7     Anion Gap 13.2 mmol/L      eGFR 107.7 mL/min/1.73     Narrative:      GFR Normal >60  Chronic Kidney Disease <60  Kidney Failure <15      Lipase [[id number]]  (Normal) Collected: 08/04/24 0346    Specimen: Blood Updated: 08/04/24 0406     Lipase 17 U/L     Magnesium [[id number]]  (Normal) Collected: 08/04/24 0346    Specimen: Blood Updated: 08/04/24 0407     Magnesium 1.9 mg/dL     Urinalysis With Microscopic If Indicated (No Culture) - Urine, Clean Catch [[id number]]  (Abnormal) Collected: 08/04/24 0502    Specimen: Urine, Clean Catch Updated: 08/04/24 0518     Color, UA Yellow     Appearance, UA Clear     pH, UA 6.0     Specific Gravity, UA 1.025     Glucose, UA Negative     Ketones, UA 15 mg/dL (1+)     Bilirubin, UA Negative     Blood, UA Trace     Protein, UA Negative     Leuk Esterase, UA Negative     Nitrite, UA Negative     Urobilinogen, UA 0.2 E.U./dL    Urinalysis, Microscopic Only - Urine, Clean  Catch [[id number]]  (Abnormal) Collected: 08/04/24 0502    Specimen: Urine, Clean Catch Updated: 08/04/24 0518     RBC, UA 11-20 /HPF      WBC, UA 3-5 /HPF      Bacteria, UA 2+ /HPF      Squamous Epithelial Cells, UA 3-6 /HPF      Hyaline Casts, UA None Seen /LPF      Mucus, UA Large/3+ /HPF      Methodology Manual Light Microscopy             No radiology results from the last 24 hrs       MDM     Amount and/or Complexity of Data Reviewed  Clinical lab tests: reviewed    Initial impression of presenting illness: Diarrhea and generalized abdominal cramps    DDX: includes but is not limited to: Gastritis versus gastroenteritis versus diarrheal illness versus food poisoning versus electrolyte abnormality    Patient arrives private vehicle with nonactionable vitals interpreted by myself.     Pertinent features from physical exam: Mild abdominal tenderness throughout all quadrants with palpation.    Initial diagnostic plan: CBC, CMP, lipase, magnesium, urinalysis    Results from initial plan were reviewed and interpreted by me revealing patient had 2+ bacteria noted in her urine.  Out of concerns for a potential urinary tract infection given the amount of diarrhea the patient has patient will be started on Macrobid.    Diagnostic information from other sources: N/A    Interventions / Re-evaluation: Patient was treated with a liter normal saline, IV Zofran IV ketorolac and p.o. Bentyl.  Patient states her pain has significantly improved.  Patient had no episodes of nausea or vomiting in the emergency department.  Patient had a few episodes of diarrhea.  Patient was feeling much better.    Medications   sodium chloride 0.9 % flush 10 mL (has no administration in time range)   sodium chloride 0.9 % flush 10 mL (has no administration in time range)   sodium chloride 0.9 % bolus 1,000 mL (0 mL Intravenous Stopped 8/4/24 8715)   dicyclomine (BENTYL) capsule 20 mg (20 mg Oral Given 8/4/24 5904)   ondansetron (ZOFRAN) injection  4 mg (4 mg Intravenous Given 8/4/24 9175)   ketorolac (TORADOL) injection 15 mg (15 mg Intravenous Given 8/4/24 4854)   nitrofurantoin (macrocrystal-monohydrate) (MACROBID) capsule 100 mg (100 mg Oral Given 8/4/24 3707)       Results/clinical rationale were discussed with patient.    Data interpreted: Nursing notes reviewed, vital signs reviewed.  Labs independently interpreted by me (CBC, CMP, lipase, UA). O2 saturation: 96% on room air    Counseling: Discussed the results above with the patient regarding need for discharge.  Patient understands and agrees plan of care.      Patient is a 48-year-old female presents to the emergency department today with complaints of multiple episodes of diarrhea for the past 2 days.  Patient denies any blood in her diarrhea.  Patient admitted to generalized abdominal cramps.  Patient was concerned she may be dehydrated due to the amount of diarrhea she has been having.  Patient had mild generalized abdominal pain with palpation.  Patient's CBC CMP, lipase and magnesium showed no actionable abnormalities.  Patient was treated with a liter normal saline, IV ketorolac IV Zofran.  Patient was also given p.o. Bentyl and her symptoms had significantly improved.  Upon urinalysis patient was found to have concerns for urinary tract infection given bacteria and white blood cells.  Patient was treated with Macrobid and will be discharged home with a 3-day course of Macrobid.  I have concerns of the patient developing urinary tract infection if she does not already have one given the amount of diarrhea she is having, we are treating her.  Patient encouraged to continue to drink plenty of p.o. fluids.  Patient will be discharged with prescription for Bentyl.  Patient was also given a prescription for Imodium but was informed to use this sparingly as we typically like diarrheal illnesses to clear on their own.  Patient's white blood cell count was unremarkable.  Patient was afebrile and not  tachycardic.  There is no evidence of blood in the patient's diarrhea per her.  Patient will be discharged home to follow-up with her PCP within the next 2 to 5 days.  Patient was informed concerning symptoms that require immediate return to emergency department.    -----  ED Disposition       ED Disposition   Discharge    Condition   Stable    Comment   --             Final diagnoses:   Diarrhea, unspecified type   Acute cystitis with hematuria      Your Follow-Up Providers       Provider, No Known. Schedule an appointment as soon as possible for a visit in 2 days.    Follow up details: If symptoms worsen, As needed  [place] [id number]                       Contact information for after-discharge care    Follow-up information has not been specified.                    Your medication list        START taking these medications        Instructions Last Dose Given Next Dose Due   dicyclomine 20 MG tablet  Commonly known as: BENTYL      Take 1 tablet by mouth Every 8 (Eight) Hours As Needed for Abdominal Cramping for up to 18 days.       loperamide 2 MG capsule  Commonly known as: IMODIUM      Take 1 capsule by mouth 4 (Four) Times a Day As Needed for Diarrhea for up to 20 days.       nitrofurantoin (macrocrystal-monohydrate) 100 MG capsule  Commonly known as: MACROBID      Take 1 capsule by mouth 2 (Two) Times a Day for 5 doses.              CONTINUE taking these medications        Instructions Last Dose Given Next Dose Due   Calcium Carb-Cholecalciferol 600-800 MG-UNIT chewable tablet      Every 12 (Twelve) Hours.       Cyanocobalamin 5000 MCG sublingual tablet      cyanocobalamin (vit B-12) 5,000 mcg sublingual tablet   Place 1 tablet every day by sublingual route.       cyclobenzaprine 10 MG tablet  Commonly known as: FLEXERIL      Every 12 (Twelve) Hours.       fexofenadine 180 MG tablet  Commonly known as: ALLEGRA      [facility]   daily       indomethacin 50 MG capsule  Commonly known  as: INDOCIN      Take 1 tablet as needed for stabbing headaches.  May take up to 3 pills a day       levothyroxine 75 MCG tablet  Commonly known as: SYNTHROID, LEVOTHROID      Take 75 mcg by mouth Daily.       Magnesium 250 MG tablet           methocarbamol 750 MG tablet  Commonly known as: ROBAXIN           multivitamin tablet tablet      Daily Multi-Vitamin tablet   Take by oral route.       naproxen 500 MG tablet  Commonly known as: NAPROSYN      Take 500 mg by mouth 2 (Two) Times a Day.       traZODone 50 MG tablet  Commonly known as: DESYREL      Take 50 mg by mouth Every Night.                 Where to Get Your Medications        These medications were sent to [facility] [address] [place] [address] - [address] [facility] [phone]  - [phone]   [address] [facility] [place] [address]      Phone: [phone]   dicyclomine 20 MG tablet  loperamide 2 MG capsule  nitrofurantoin (macrocrystal-monohydrate) 100 MG capsule

## 2025-01-17 ENCOUNTER — LAB REQUISITION (OUTPATIENT)
Dept: LAB | Facility: HOSPITAL | Age: 50
End: 2025-01-17
Payer: MEDICAID

## 2025-01-17 DIAGNOSIS — N92.4 EXCESSIVE BLEEDING IN THE PREMENOPAUSAL PERIOD: ICD-10-CM

## 2025-01-17 PROCEDURE — 88305 TISSUE EXAM BY PATHOLOGIST: CPT | Performed by: OBSTETRICS & GYNECOLOGY

## 2025-01-20 LAB
CYTO UR: NORMAL
LAB AP CASE REPORT: NORMAL
LAB AP CLINICAL INFORMATION: NORMAL
PATH REPORT.FINAL DX SPEC: NORMAL
PATH REPORT.GROSS SPEC: NORMAL